# Patient Record
Sex: MALE | Race: WHITE | NOT HISPANIC OR LATINO | URBAN - METROPOLITAN AREA
[De-identification: names, ages, dates, MRNs, and addresses within clinical notes are randomized per-mention and may not be internally consistent; named-entity substitution may affect disease eponyms.]

---

## 2018-11-26 ENCOUNTER — APPOINTMENT (RX ONLY)
Dept: URBAN - METROPOLITAN AREA CLINIC 90 | Facility: CLINIC | Age: 63
Setting detail: DERMATOLOGY
End: 2018-11-26

## 2018-11-26 DIAGNOSIS — D18.0 HEMANGIOMA: ICD-10-CM

## 2018-11-26 DIAGNOSIS — L82.0 INFLAMED SEBORRHEIC KERATOSIS: ICD-10-CM

## 2018-11-26 DIAGNOSIS — D485 NEOPLASM OF UNCERTAIN BEHAVIOR OF SKIN: ICD-10-CM

## 2018-11-26 DIAGNOSIS — L57.0 ACTINIC KERATOSIS: ICD-10-CM

## 2018-11-26 DIAGNOSIS — D22 MELANOCYTIC NEVI: ICD-10-CM

## 2018-11-26 DIAGNOSIS — L82.1 OTHER SEBORRHEIC KERATOSIS: ICD-10-CM

## 2018-11-26 PROBLEM — D23.72 OTHER BENIGN NEOPLASM OF SKIN OF LEFT LOWER LIMB, INCLUDING HIP: Status: ACTIVE | Noted: 2018-11-26

## 2018-11-26 PROBLEM — D18.01 HEMANGIOMA OF SKIN AND SUBCUTANEOUS TISSUE: Status: ACTIVE | Noted: 2018-11-26

## 2018-11-26 PROBLEM — D48.5 NEOPLASM OF UNCERTAIN BEHAVIOR OF SKIN: Status: ACTIVE | Noted: 2018-11-26

## 2018-11-26 PROBLEM — D22.5 MELANOCYTIC NEVI OF TRUNK: Status: ACTIVE | Noted: 2018-11-26

## 2018-11-26 PROCEDURE — 11100: CPT | Mod: 59

## 2018-11-26 PROCEDURE — ? BIOPSY BY SHAVE METHOD

## 2018-11-26 PROCEDURE — ? LIQUID NITROGEN

## 2018-11-26 PROCEDURE — 17110 DESTRUCTION B9 LES UP TO 14: CPT

## 2018-11-26 PROCEDURE — 99214 OFFICE O/P EST MOD 30 MIN: CPT | Mod: 25

## 2018-11-26 PROCEDURE — 17000 DESTRUCT PREMALG LESION: CPT | Mod: 59

## 2018-11-26 PROCEDURE — ? COUNSELING

## 2018-11-26 ASSESSMENT — LOCATION SIMPLE DESCRIPTION DERM
LOCATION SIMPLE: CHEST
LOCATION SIMPLE: RIGHT UPPER BACK
LOCATION SIMPLE: RIGHT UPPER ARM
LOCATION SIMPLE: RIGHT CHEEK
LOCATION SIMPLE: LEFT UPPER BACK
LOCATION SIMPLE: RIGHT LOWER BACK
LOCATION SIMPLE: RIGHT EAR
LOCATION SIMPLE: RIGHT POSTERIOR THIGH
LOCATION SIMPLE: ABDOMEN
LOCATION SIMPLE: LOWER BACK

## 2018-11-26 ASSESSMENT — LOCATION ZONE DERM
LOCATION ZONE: ARM
LOCATION ZONE: FACE
LOCATION ZONE: TRUNK
LOCATION ZONE: EAR
LOCATION ZONE: LEG

## 2018-11-26 ASSESSMENT — LOCATION DETAILED DESCRIPTION DERM
LOCATION DETAILED: STERNUM
LOCATION DETAILED: SUPERIOR LUMBAR SPINE
LOCATION DETAILED: RIGHT SUPERIOR MEDIAL MIDBACK
LOCATION DETAILED: RIGHT SUPERIOR MEDIAL LOWER BACK
LOCATION DETAILED: LEFT MID-UPPER BACK
LOCATION DETAILED: EPIGASTRIC SKIN
LOCATION DETAILED: RIGHT DISTAL POSTERIOR THIGH
LOCATION DETAILED: RIGHT DISTAL POSTERIOR UPPER ARM
LOCATION DETAILED: PERIUMBILICAL SKIN
LOCATION DETAILED: RIGHT SUPERIOR MEDIAL UPPER BACK
LOCATION DETAILED: RIGHT LATERAL ABDOMEN
LOCATION DETAILED: RIGHT SUPERIOR CRUS OF ANTIHELIX
LOCATION DETAILED: RIGHT SUPERIOR CENTRAL MALAR CHEEK
LOCATION DETAILED: LEFT LATERAL ABDOMEN
LOCATION DETAILED: RIGHT MID-UPPER BACK

## 2018-11-26 NOTE — PROCEDURE: LIQUID NITROGEN
Post-Care Instructions: I reviewed with the patient in detail post-care instructions. Patient is to wear sunprotection, and avoid picking at any of the treated lesions. Pt may apply Vaseline to crusted or scabbing areas.
Render Post-Care Instructions In Note?: no
Consent: The patient's consent was obtained including but not limited to risks of crusting, scabbing, blistering, scarring, darker or lighter pigmentary change, recurrence, incomplete removal and infection.
Number Of Freeze-Thaw Cycles: 1 freeze-thaw cycle
Detail Level: Simple
Duration Of Freeze Thaw-Cycle (Seconds): 5
Medical Necessity Information: It is in your best interest to select a reason for this procedure from the list below. All of these items fulfill various CMS LCD requirements except the new and changing color options.
Duration Of Freeze Thaw-Cycle (Seconds): 8
Medical Necessity Clause: This procedure was medically necessary because the lesions that were treated were:

## 2018-11-26 NOTE — PROCEDURE: BIOPSY BY SHAVE METHOD
Type Of Destruction Used: Curettage
Bill 67470 For Specimen Handling/Conveyance To Laboratory?: no
Anesthesia Volume In Cc (Will Not Render If 0): 0.5
Biopsy Type: H and E
Cryotherapy Text: The wound bed was treated with cryotherapy after the biopsy was performed.
X Size Of Lesion In Cm: 0
Detail Level: Simple
Hemostasis: Aluminum Chloride
Billing Type: [employer]
Post-Care Instructions: I reviewed with the patient in detail post-care instructions. Patient is to keep the biopsy site dry overnight, and then apply bacitracin twice daily until healed. Patient may apply hydrogen peroxide soaks to remove any crusting.
Curettage Text: The wound bed was treated with curettage after the biopsy was performed.
Silver Nitrate Text: The wound bed was treated with silver nitrate after the biopsy was performed.
Electrodesiccation Text: The wound bed was treated with electrodesiccation after the biopsy was performed.
Depth Of Biopsy: dermis
[facility] [address] [facility]
Was A Bandage Applied: Yes
Wound Care: Vaseline
Dressing: bandage
Consent: Written consent was obtained and risks were reviewed including but not limited to scarring, infection, bleeding, scabbing, incomplete removal, nerve damage and allergy to anesthesia.
Biopsy Method: 15 blade
Notification Instructions: Patient will be notified of biopsy results. However, patient instructed to call the office if not contacted within 2 weeks.
Size Of Lesion In Cm: 0.6
Electrodesiccation And Curettage Text: The wound bed was treated with electrodesiccation and curettage after the biopsy was performed.

## 2020-05-22 ENCOUNTER — APPOINTMENT (RX ONLY)
Dept: URBAN - METROPOLITAN AREA CLINIC 90 | Facility: CLINIC | Age: 65
Setting detail: DERMATOLOGY
End: 2020-05-22

## 2020-05-22 DIAGNOSIS — D18.0 HEMANGIOMA: ICD-10-CM

## 2020-05-22 DIAGNOSIS — L82.1 OTHER SEBORRHEIC KERATOSIS: ICD-10-CM

## 2020-05-22 DIAGNOSIS — D22 MELANOCYTIC NEVI: ICD-10-CM

## 2020-05-22 DIAGNOSIS — L57.0 ACTINIC KERATOSIS: ICD-10-CM

## 2020-05-22 DIAGNOSIS — L81.2 FRECKLES: ICD-10-CM

## 2020-05-22 PROBLEM — D22.5 MELANOCYTIC NEVI OF TRUNK: Status: ACTIVE | Noted: 2020-05-22

## 2020-05-22 PROBLEM — D23.72 OTHER BENIGN NEOPLASM OF SKIN OF LEFT LOWER LIMB, INCLUDING HIP: Status: ACTIVE | Noted: 2020-05-22

## 2020-05-22 PROBLEM — D18.01 HEMANGIOMA OF SKIN AND SUBCUTANEOUS TISSUE: Status: ACTIVE | Noted: 2020-05-22

## 2020-05-22 PROCEDURE — ? COUNSELING

## 2020-05-22 PROCEDURE — ? LIQUID NITROGEN

## 2020-05-22 PROCEDURE — 99214 OFFICE O/P EST MOD 30 MIN: CPT | Mod: 25

## 2020-05-22 PROCEDURE — 17000 DESTRUCT PREMALG LESION: CPT

## 2020-05-22 PROCEDURE — 17003 DESTRUCT PREMALG LES 2-14: CPT

## 2020-05-22 ASSESSMENT — LOCATION SIMPLE DESCRIPTION DERM
LOCATION SIMPLE: LEFT UPPER ARM
LOCATION SIMPLE: ABDOMEN
LOCATION SIMPLE: LEFT FOREHEAD
LOCATION SIMPLE: RIGHT LOWER BACK
LOCATION SIMPLE: RIGHT THIGH
LOCATION SIMPLE: LOWER BACK
LOCATION SIMPLE: CHEST
LOCATION SIMPLE: RIGHT UPPER BACK
LOCATION SIMPLE: LEFT FOREARM
LOCATION SIMPLE: LEFT UPPER BACK
LOCATION SIMPLE: UPPER BACK
LOCATION SIMPLE: RIGHT UPPER ARM
LOCATION SIMPLE: LEFT THIGH
LOCATION SIMPLE: LEFT TEMPLE
LOCATION SIMPLE: NECK

## 2020-05-22 ASSESSMENT — LOCATION DETAILED DESCRIPTION DERM
LOCATION DETAILED: LEFT ANTERIOR PROXIMAL THIGH
LOCATION DETAILED: LEFT MID-UPPER BACK
LOCATION DETAILED: LEFT DISTAL POSTERIOR UPPER ARM
LOCATION DETAILED: RIGHT ANTERIOR DISTAL THIGH
LOCATION DETAILED: RIGHT LATERAL ABDOMEN
LOCATION DETAILED: LEFT INFERIOR FOREHEAD
LOCATION DETAILED: LEFT DISTAL DORSAL FOREARM
LOCATION DETAILED: RIGHT MID-UPPER BACK
LOCATION DETAILED: LEFT CENTRAL POSTERIOR NECK
LOCATION DETAILED: INFERIOR THORACIC SPINE
LOCATION DETAILED: EPIGASTRIC SKIN
LOCATION DETAILED: RIGHT SUPERIOR MEDIAL LOWER BACK
LOCATION DETAILED: STERNUM
LOCATION DETAILED: PERIUMBILICAL SKIN
LOCATION DETAILED: RIGHT SUPERIOR MEDIAL MIDBACK
LOCATION DETAILED: SUPERIOR LUMBAR SPINE
LOCATION DETAILED: LEFT CENTRAL TEMPLE
LOCATION DETAILED: LEFT LATERAL ABDOMEN
LOCATION DETAILED: RIGHT DISTAL POSTERIOR UPPER ARM

## 2020-05-22 ASSESSMENT — LOCATION ZONE DERM
LOCATION ZONE: TRUNK
LOCATION ZONE: FACE
LOCATION ZONE: NECK
LOCATION ZONE: LEG
LOCATION ZONE: ARM

## 2020-05-22 NOTE — PROCEDURE: LIQUID NITROGEN
Consent: The patient's consent was obtained including but not limited to risks of crusting, scabbing, blistering, scarring, darker or lighter pigmentary change, recurrence, incomplete removal and infection.
Post-Care Instructions: I reviewed with the patient in detail post-care instructions. Patient is to wear sunprotection, and avoid picking at any of the treated lesions. Pt may apply Vaseline to crusted or scabbing areas.
Detail Level: Simple
Render Post-Care Instructions In Note?: no
Number Of Freeze-Thaw Cycles: 1 freeze-thaw cycle
Duration Of Freeze Thaw-Cycle (Seconds): 8

## 2020-11-16 ENCOUNTER — APPOINTMENT (RX ONLY)
Dept: URBAN - METROPOLITAN AREA CLINIC 90 | Facility: CLINIC | Age: 65
Setting detail: DERMATOLOGY
End: 2020-11-16

## 2020-11-16 VITALS — WEIGHT: 215 LBS | HEIGHT: 71 IN

## 2020-11-16 DIAGNOSIS — L57.0 ACTINIC KERATOSIS: ICD-10-CM

## 2020-11-16 DIAGNOSIS — D22 MELANOCYTIC NEVI: ICD-10-CM

## 2020-11-16 DIAGNOSIS — D485 NEOPLASM OF UNCERTAIN BEHAVIOR OF SKIN: ICD-10-CM

## 2020-11-16 DIAGNOSIS — L73.8 OTHER SPECIFIED FOLLICULAR DISORDERS: ICD-10-CM

## 2020-11-16 PROBLEM — D48.5 NEOPLASM OF UNCERTAIN BEHAVIOR OF SKIN: Status: ACTIVE | Noted: 2020-11-16

## 2020-11-16 PROBLEM — D22.5 MELANOCYTIC NEVI OF TRUNK: Status: ACTIVE | Noted: 2020-11-16

## 2020-11-16 PROCEDURE — ? BIOPSY BY SHAVE METHOD

## 2020-11-16 PROCEDURE — ? COUNSELING

## 2020-11-16 PROCEDURE — 17000 DESTRUCT PREMALG LESION: CPT | Mod: 59

## 2020-11-16 PROCEDURE — 99214 OFFICE O/P EST MOD 30 MIN: CPT | Mod: 25

## 2020-11-16 PROCEDURE — 11102 TANGNTL BX SKIN SINGLE LES: CPT

## 2020-11-16 PROCEDURE — ? LIQUID NITROGEN

## 2020-11-16 ASSESSMENT — LOCATION SIMPLE DESCRIPTION DERM
LOCATION SIMPLE: LEFT TEMPLE
LOCATION SIMPLE: CHEST
LOCATION SIMPLE: LEFT FOREHEAD
LOCATION SIMPLE: RIGHT FOREHEAD
LOCATION SIMPLE: LEFT UPPER BACK

## 2020-11-16 ASSESSMENT — LOCATION DETAILED DESCRIPTION DERM
LOCATION DETAILED: LEFT SUPERIOR FOREHEAD
LOCATION DETAILED: LEFT MID TEMPLE
LOCATION DETAILED: RIGHT MEDIAL FOREHEAD
LOCATION DETAILED: LEFT INFERIOR UPPER BACK
LOCATION DETAILED: STERNUM

## 2020-11-16 ASSESSMENT — LOCATION ZONE DERM
LOCATION ZONE: TRUNK
LOCATION ZONE: FACE

## 2020-11-16 NOTE — PROCEDURE: BIOPSY BY SHAVE METHOD

## 2021-05-17 ENCOUNTER — APPOINTMENT (RX ONLY)
Dept: URBAN - METROPOLITAN AREA CLINIC 90 | Facility: CLINIC | Age: 66
Setting detail: DERMATOLOGY
End: 2021-05-17

## 2021-05-17 DIAGNOSIS — L82.1 OTHER SEBORRHEIC KERATOSIS: ICD-10-CM

## 2021-05-17 DIAGNOSIS — L57.0 ACTINIC KERATOSIS: ICD-10-CM

## 2021-05-17 PROBLEM — D23.72 OTHER BENIGN NEOPLASM OF SKIN OF LEFT LOWER LIMB, INCLUDING HIP: Status: ACTIVE | Noted: 2021-05-17

## 2021-05-17 PROCEDURE — 17000 DESTRUCT PREMALG LESION: CPT

## 2021-05-17 PROCEDURE — ? COUNSELING

## 2021-05-17 PROCEDURE — 99213 OFFICE O/P EST LOW 20 MIN: CPT | Mod: 25

## 2021-05-17 PROCEDURE — 17003 DESTRUCT PREMALG LES 2-14: CPT

## 2021-05-17 PROCEDURE — ? LIQUID NITROGEN

## 2021-05-17 ASSESSMENT — LOCATION DETAILED DESCRIPTION DERM
LOCATION DETAILED: LEFT SUPERIOR FOREHEAD
LOCATION DETAILED: RIGHT SUPERIOR HELIX
LOCATION DETAILED: RIGHT MEDIAL TRAPEZIAL NECK

## 2021-05-17 ASSESSMENT — LOCATION ZONE DERM
LOCATION ZONE: NECK
LOCATION ZONE: EAR
LOCATION ZONE: FACE

## 2021-05-17 ASSESSMENT — LOCATION SIMPLE DESCRIPTION DERM
LOCATION SIMPLE: LEFT FOREHEAD
LOCATION SIMPLE: RIGHT EAR
LOCATION SIMPLE: POSTERIOR NECK

## 2022-07-10 NOTE — PROCEDURE: LIQUID NITROGEN
Number Of Freeze-Thaw Cycles: 1 freeze-thaw cycle
Post-Care Instructions: I reviewed with the patient in detail post-care instructions. Patient is to wear sunprotection, and avoid picking at any of the treated lesions. Pt may apply Vaseline to crusted or scabbing areas.
Render Note In Bullet Format When Appropriate: No
Consent: The patient's consent was obtained including but not limited to risks of crusting, scabbing, blistering, scarring, darker or lighter pigmentary change, recurrence, incomplete removal and infection.
Duration Of Freeze Thaw-Cycle (Seconds): 6
Detail Level: Simple
10-Jul-2022 02:49

## 2023-11-14 ENCOUNTER — APPOINTMENT (RX ONLY)
Dept: URBAN - METROPOLITAN AREA CLINIC 90 | Facility: CLINIC | Age: 68
Setting detail: DERMATOLOGY
End: 2023-11-14

## 2023-11-14 VITALS — HEIGHT: 71 IN | WEIGHT: 225 LBS

## 2023-11-14 DIAGNOSIS — D22 MELANOCYTIC NEVI: ICD-10-CM

## 2023-11-14 DIAGNOSIS — L57.3 POIKILODERMA OF CIVATTE: ICD-10-CM

## 2023-11-14 DIAGNOSIS — L21.8 OTHER SEBORRHEIC DERMATITIS: ICD-10-CM

## 2023-11-14 DIAGNOSIS — L82.1 OTHER SEBORRHEIC KERATOSIS: ICD-10-CM

## 2023-11-14 DIAGNOSIS — Z80.8 FAMILY HISTORY OF MALIGNANT NEOPLASM OF OTHER ORGANS OR SYSTEMS: ICD-10-CM

## 2023-11-14 DIAGNOSIS — L57.0 ACTINIC KERATOSIS: ICD-10-CM

## 2023-11-14 DIAGNOSIS — L81.5 LEUKODERMA, NOT ELSEWHERE CLASSIFIED: ICD-10-CM

## 2023-11-14 DIAGNOSIS — D18.0 HEMANGIOMA: ICD-10-CM

## 2023-11-14 PROBLEM — D18.01 HEMANGIOMA OF SKIN AND SUBCUTANEOUS TISSUE: Status: ACTIVE | Noted: 2023-11-14

## 2023-11-14 PROBLEM — D22.5 MELANOCYTIC NEVI OF TRUNK: Status: ACTIVE | Noted: 2023-11-14

## 2023-11-14 PROBLEM — D23.72 OTHER BENIGN NEOPLASM OF SKIN OF LEFT LOWER LIMB, INCLUDING HIP: Status: ACTIVE | Noted: 2023-11-14

## 2023-11-14 PROCEDURE — 17003 DESTRUCT PREMALG LES 2-14: CPT

## 2023-11-14 PROCEDURE — ? PRESCRIPTION

## 2023-11-14 PROCEDURE — ? TREATMENT REGIMEN

## 2023-11-14 PROCEDURE — ? LIQUID NITROGEN

## 2023-11-14 PROCEDURE — ? COUNSELING

## 2023-11-14 PROCEDURE — 99213 OFFICE O/P EST LOW 20 MIN: CPT | Mod: 25

## 2023-11-14 PROCEDURE — 17000 DESTRUCT PREMALG LESION: CPT

## 2023-11-14 RX ORDER — KETOCONAZOLE 20 MG/ML
SHAMPOO, SUSPENSION TOPICAL EVERY OTHER DAY
Qty: 120 | Refills: 3 | Status: ERX | COMMUNITY
Start: 2023-11-14

## 2023-11-14 RX ADMIN — KETOCONAZOLE: 20 SHAMPOO, SUSPENSION TOPICAL at 00:00

## 2023-11-14 ASSESSMENT — LOCATION SIMPLE DESCRIPTION DERM
LOCATION SIMPLE: RIGHT EAR
LOCATION SIMPLE: UPPER BACK
LOCATION SIMPLE: ABDOMEN
LOCATION SIMPLE: POSTERIOR SCALP
LOCATION SIMPLE: LEFT FOREHEAD
LOCATION SIMPLE: RIGHT UPPER BACK
LOCATION SIMPLE: LEFT FOREARM
LOCATION SIMPLE: CHEST
LOCATION SIMPLE: RIGHT FOREARM
LOCATION SIMPLE: RIGHT FOREHEAD

## 2023-11-14 ASSESSMENT — LOCATION DETAILED DESCRIPTION DERM
LOCATION DETAILED: SUPERIOR THORACIC SPINE
LOCATION DETAILED: RIGHT FOREHEAD
LOCATION DETAILED: RIGHT MEDIAL UPPER BACK
LOCATION DETAILED: LEFT PROXIMAL DORSAL FOREARM
LOCATION DETAILED: RIGHT PROXIMAL DORSAL FOREARM
LOCATION DETAILED: RIGHT SUPERIOR HELIX
LOCATION DETAILED: STERNUM
LOCATION DETAILED: LEFT FOREHEAD
LOCATION DETAILED: POSTERIOR MID-PARIETAL SCALP
LOCATION DETAILED: EPIGASTRIC SKIN

## 2023-11-14 ASSESSMENT — LOCATION ZONE DERM
LOCATION ZONE: ARM
LOCATION ZONE: SCALP
LOCATION ZONE: TRUNK
LOCATION ZONE: EAR
LOCATION ZONE: FACE

## 2023-11-14 NOTE — PROCEDURE: LIQUID NITROGEN
Render Post-Care Instructions In Note?: no
Number Of Freeze-Thaw Cycles: 1 freeze-thaw cycle
Detail Level: Detailed
Show Aperture Variable?: Yes
Consent: The patient's consent was obtained including but not limited to risks of crusting, scabbing, blistering, scarring, darker or lighter pigmentary change, recurrence, incomplete removal and infection.
Post-Care Instructions: I reviewed with the patient in detail post-care instructions. Patient is to wear sunprotection, and avoid picking at any of the treated lesions. Pt may apply Vaseline to crusted or scabbing areas.
Duration Of Freeze Thaw-Cycle (Seconds): 10

## 2024-05-14 ENCOUNTER — APPOINTMENT (RX ONLY)
Dept: URBAN - METROPOLITAN AREA CLINIC 90 | Facility: CLINIC | Age: 69
Setting detail: DERMATOLOGY
End: 2024-05-14

## 2024-05-14 VITALS — WEIGHT: 222 LBS | HEIGHT: 71 IN

## 2024-05-14 DIAGNOSIS — D18.0 HEMANGIOMA: ICD-10-CM

## 2024-05-14 DIAGNOSIS — L21.8 OTHER SEBORRHEIC DERMATITIS: ICD-10-CM

## 2024-05-14 DIAGNOSIS — L57.3 POIKILODERMA OF CIVATTE: ICD-10-CM

## 2024-05-14 DIAGNOSIS — Z80.8 FAMILY HISTORY OF MALIGNANT NEOPLASM OF OTHER ORGANS OR SYSTEMS: ICD-10-CM

## 2024-05-14 DIAGNOSIS — L81.5 LEUKODERMA, NOT ELSEWHERE CLASSIFIED: ICD-10-CM

## 2024-05-14 DIAGNOSIS — D22 MELANOCYTIC NEVI: ICD-10-CM

## 2024-05-14 PROBLEM — D22.5 MELANOCYTIC NEVI OF TRUNK: Status: ACTIVE | Noted: 2024-05-14

## 2024-05-14 PROBLEM — D23.72 OTHER BENIGN NEOPLASM OF SKIN OF LEFT LOWER LIMB, INCLUDING HIP: Status: ACTIVE | Noted: 2024-05-14

## 2024-05-14 PROBLEM — D18.01 HEMANGIOMA OF SKIN AND SUBCUTANEOUS TISSUE: Status: ACTIVE | Noted: 2024-05-14

## 2024-05-14 PROCEDURE — 99213 OFFICE O/P EST LOW 20 MIN: CPT

## 2024-05-14 PROCEDURE — ? COUNSELING

## 2024-05-14 PROCEDURE — ? PRESCRIPTION

## 2024-05-14 PROCEDURE — ? TREATMENT REGIMEN

## 2024-05-14 RX ORDER — FLUOCINONIDE 0.5 MG/ML
SOLUTION TOPICAL BID
Qty: 60 | Refills: 5 | Status: ERX | COMMUNITY
Start: 2024-05-14

## 2024-05-14 RX ADMIN — FLUOCINONIDE: 0.5 SOLUTION TOPICAL at 00:00

## 2024-05-14 ASSESSMENT — LOCATION ZONE DERM
LOCATION ZONE: SCALP
LOCATION ZONE: ARM
LOCATION ZONE: TRUNK

## 2024-05-14 ASSESSMENT — LOCATION DETAILED DESCRIPTION DERM
LOCATION DETAILED: RIGHT PROXIMAL DORSAL FOREARM
LOCATION DETAILED: POSTERIOR MID-PARIETAL SCALP
LOCATION DETAILED: RIGHT MEDIAL UPPER BACK
LOCATION DETAILED: LEFT PROXIMAL DORSAL FOREARM
LOCATION DETAILED: EPIGASTRIC SKIN
LOCATION DETAILED: STERNUM
LOCATION DETAILED: SUPERIOR THORACIC SPINE

## 2024-05-14 ASSESSMENT — LOCATION SIMPLE DESCRIPTION DERM
LOCATION SIMPLE: RIGHT UPPER BACK
LOCATION SIMPLE: ABDOMEN
LOCATION SIMPLE: LEFT FOREARM
LOCATION SIMPLE: POSTERIOR SCALP
LOCATION SIMPLE: CHEST
LOCATION SIMPLE: UPPER BACK
LOCATION SIMPLE: RIGHT FOREARM

## 2024-11-19 ENCOUNTER — APPOINTMENT (RX ONLY)
Dept: URBAN - METROPOLITAN AREA CLINIC 90 | Facility: CLINIC | Age: 69
Setting detail: DERMATOLOGY
End: 2024-11-19

## 2024-11-19 VITALS — WEIGHT: 220 LBS | HEIGHT: 71 IN

## 2024-11-19 DIAGNOSIS — L82.1 OTHER SEBORRHEIC KERATOSIS: ICD-10-CM

## 2024-11-19 DIAGNOSIS — D18.0 HEMANGIOMA: ICD-10-CM

## 2024-11-19 DIAGNOSIS — L21.8 OTHER SEBORRHEIC DERMATITIS: ICD-10-CM

## 2024-11-19 DIAGNOSIS — H01.13 ECZEMATOUS DERMATITIS OF EYELID: ICD-10-CM

## 2024-11-19 DIAGNOSIS — Z80.8 FAMILY HISTORY OF MALIGNANT NEOPLASM OF OTHER ORGANS OR SYSTEMS: ICD-10-CM

## 2024-11-19 PROBLEM — H01.131 ECZEMATOUS DERMATITIS OF RIGHT UPPER EYELID: Status: ACTIVE | Noted: 2024-11-19

## 2024-11-19 PROBLEM — D23.72 OTHER BENIGN NEOPLASM OF SKIN OF LEFT LOWER LIMB, INCLUDING HIP: Status: ACTIVE | Noted: 2024-11-19

## 2024-11-19 PROBLEM — D18.01 HEMANGIOMA OF SKIN AND SUBCUTANEOUS TISSUE: Status: ACTIVE | Noted: 2024-11-19

## 2024-11-19 PROCEDURE — ? PRESCRIPTION

## 2024-11-19 PROCEDURE — ? PRESCRIPTION MEDICATION MANAGEMENT

## 2024-11-19 PROCEDURE — 99213 OFFICE O/P EST LOW 20 MIN: CPT

## 2024-11-19 PROCEDURE — ? COUNSELING

## 2024-11-19 RX ORDER — HYDROCORTISONE 25 MG/G
CREAM TOPICAL BID
Qty: 30 | Refills: 0 | Status: ERX | COMMUNITY
Start: 2024-11-19

## 2024-11-19 RX ORDER — SELENIUM SULFIDE 22.5 MG/ML
SHAMPOO TOPICAL
Qty: 180 | Refills: 4 | Status: ERX | COMMUNITY
Start: 2024-11-19

## 2024-11-19 RX ADMIN — HYDROCORTISONE: 25 CREAM TOPICAL at 00:00

## 2024-11-19 RX ADMIN — SELENIUM SULFIDE: 22.5 SHAMPOO TOPICAL at 00:00

## 2024-11-19 ASSESSMENT — LOCATION SIMPLE DESCRIPTION DERM
LOCATION SIMPLE: LEFT FOREHEAD
LOCATION SIMPLE: POSTERIOR SCALP
LOCATION SIMPLE: RIGHT SUPERIOR EYELID
LOCATION SIMPLE: UPPER BACK
LOCATION SIMPLE: RIGHT FOREHEAD

## 2024-11-19 ASSESSMENT — LOCATION DETAILED DESCRIPTION DERM
LOCATION DETAILED: RIGHT LATERAL SUPERIOR EYELID
LOCATION DETAILED: SUPERIOR THORACIC SPINE
LOCATION DETAILED: LEFT FOREHEAD
LOCATION DETAILED: INFERIOR THORACIC SPINE
LOCATION DETAILED: POSTERIOR MID-PARIETAL SCALP
LOCATION DETAILED: RIGHT INFERIOR FOREHEAD

## 2024-11-19 ASSESSMENT — LOCATION ZONE DERM
LOCATION ZONE: TRUNK
LOCATION ZONE: SCALP
LOCATION ZONE: FACE
LOCATION ZONE: EYELID

## 2024-11-19 NOTE — PROCEDURE: PRESCRIPTION MEDICATION MANAGEMENT
Render In Strict Bullet Format?: Yes
Continue Regimen: Floucinonide 0.05%
Detail Level: Detailed
Initiate Treatment: selenium sulfide 2.25 % shampoo
Initiate Treatment: hydrocortisone 2.5 % topical cream BID